# Patient Record
Sex: FEMALE | NOT HISPANIC OR LATINO | Employment: OTHER | URBAN - NONMETROPOLITAN AREA
[De-identification: names, ages, dates, MRNs, and addresses within clinical notes are randomized per-mention and may not be internally consistent; named-entity substitution may affect disease eponyms.]

---

## 2017-08-02 RX ORDER — FUROSEMIDE 40 MG/1
40 TABLET ORAL DAILY
COMMUNITY
End: 2017-09-14 | Stop reason: ALTCHOICE

## 2017-08-02 RX ORDER — ALPRAZOLAM 0.5 MG/1
0.5 TABLET ORAL NIGHTLY PRN
COMMUNITY
End: 2017-09-14 | Stop reason: ALTCHOICE

## 2017-08-02 RX ORDER — CHLORTHALIDONE 25 MG/1
12.5 TABLET ORAL DAILY
COMMUNITY

## 2017-08-02 RX ORDER — ROSUVASTATIN CALCIUM 10 MG/1
10 TABLET, COATED ORAL NIGHTLY
COMMUNITY

## 2017-08-02 RX ORDER — ASPIRIN 81 MG/1
81 TABLET ORAL DAILY
COMMUNITY

## 2017-08-02 RX ORDER — UBIDECARENONE 50 MG
CAPSULE ORAL DAILY
COMMUNITY

## 2017-08-02 RX ORDER — LOSARTAN POTASSIUM 100 MG/1
100 TABLET ORAL DAILY
COMMUNITY

## 2017-08-02 RX ORDER — LEVOTHYROXINE SODIUM 112 UG/1
112 TABLET ORAL DAILY
COMMUNITY

## 2017-08-02 RX ORDER — NIFEDIPINE 90 MG/1
90 TABLET, EXTENDED RELEASE ORAL DAILY
COMMUNITY

## 2017-08-02 RX ORDER — MAGNESIUM OXIDE 400 MG/1
400 TABLET ORAL 2 TIMES DAILY
COMMUNITY

## 2017-08-02 RX ORDER — MELATONIN 200 MCG
3 TABLET ORAL NIGHTLY
COMMUNITY

## 2017-08-03 ENCOUNTER — OFFICE VISIT (OUTPATIENT)
Dept: CARDIOLOGY | Facility: CLINIC | Age: 82
End: 2017-08-03

## 2017-08-03 VITALS
DIASTOLIC BLOOD PRESSURE: 66 MMHG | BODY MASS INDEX: 24.07 KG/M2 | HEIGHT: 64 IN | SYSTOLIC BLOOD PRESSURE: 149 MMHG | HEART RATE: 61 BPM | WEIGHT: 141 LBS

## 2017-08-03 DIAGNOSIS — I50.32 CHRONIC DIASTOLIC CHF (CONGESTIVE HEART FAILURE) (HCC): ICD-10-CM

## 2017-08-03 DIAGNOSIS — R06.02 SHORTNESS OF BREATH: ICD-10-CM

## 2017-08-03 DIAGNOSIS — I25.10 ATHEROSCLEROSIS OF NATIVE CORONARY ARTERY OF NATIVE HEART WITHOUT ANGINA PECTORIS: ICD-10-CM

## 2017-08-03 DIAGNOSIS — I48.3 TYPICAL ATRIAL FLUTTER (HCC): Primary | ICD-10-CM

## 2017-08-03 PROCEDURE — 99204 OFFICE O/P NEW MOD 45 MIN: CPT | Performed by: INTERNAL MEDICINE

## 2017-08-03 PROCEDURE — 93000 ELECTROCARDIOGRAM COMPLETE: CPT | Performed by: INTERNAL MEDICINE

## 2017-08-03 NOTE — PROGRESS NOTES
Subjective:     Encounter Date: 08/03/2017      Patient ID: Caty Sheehan is a 82 y.o. female.With a history of coronary artery disease status post 2 separate coronary artery bypass grafting procedures, the most recent in February 2017, postoperative atrial fibrillation now with new onset atrial flutter, hypertension, hyperlipidemia, who presents today to establish cardiac care locally.    Chief Complaint: Establish cardiac care    Coronary Artery Disease   Presents for follow-up visit. Symptoms include leg swelling, palpitations and shortness of breath. Pertinent negatives include no chest pain, chest pressure, chest tightness, dizziness, muscle weakness or weight gain. The symptoms have been stable. Compliance with diet is good. Compliance with exercise is variable. Compliance with medications is good.   Shortness of Breath   This is a recurrent problem. The current episode started more than 1 month ago. The problem has been gradually worsening. Associated symptoms include leg swelling. Pertinent negatives include no abdominal pain, chest pain, claudication, fever, headaches, hemoptysis, neck pain, orthopnea, PND, rash, syncope, vomiting or wheezing. The symptoms are aggravated by exercise. She has tried rest for the symptoms. The treatment provided moderate relief. Her past medical history is significant for CAD and a heart failure (chronic diastolic dysfunction).   Atrial Flutter   This is a new problem. The current episode started more than 1 month ago. The problem has been unchanged. Associated symptoms include weakness. Pertinent negatives include no abdominal pain, change in bowel habit, chest pain, chills, congestion, coughing, fever, headaches, joint swelling, myalgias, nausea, neck pain, numbness, rash or vomiting. Nothing aggravates the symptoms. She has tried nothing for the symptoms.     This is an 82-year-old white female history of coronary artery disease, status post 2 separate coronary artery  bypass grafting procedures, hypertension, hyperlipidemia, type II diabetes mellitus who presents today to establish cardiac care.  The patient underwent cardiac catheterization subsequent coronary artery bypass grafting in February 2017.  She had a postoperative course that was somewhat prolonged with recurrent admission for volume overload in the setting of chronic diastolic dysfunction.  The patient had some degree of postoperative atrial fibrillation but this resolved.  Over the last month, her granddaughter and her of noticed irregularity in her pulse and as a result of this, the patient has developed increasing shortness of breath and dyspnea on exertion that is now limiting to her activities.  These symptoms are different than what she had previously experienced prior to her coronary artery bypass grafting.  She denies any specific orthopnea, PND.  She has intermittent lower extremity edema that is not increasing.  Her weight has been stable.  She denies any significant cough or wheeze or fevers.  The patient says that her blood pressures generally well-controlled.  She does note occasional palpitations.  She denies any lightheadedness, dizziness, syncope.  She has been compliant with all of her medications.  She denies any chest discomfort.  He admits to generalized weakness and fatigue and simply states that her recovery has been somewhat prolonged.  Her granddaughter seems to think that her course dramatically worsened in terms of her energy level and her shortness of breath when she developed what appears to be atrial flutter.    The following portions of the patient's history were reviewed and updated as appropriate: allergies, current medications, past family history, past medical history, past social history, past surgical history and problem list.     Past Medical History:   Diagnosis Date   • Arthritis    • Atherosclerosis of native coronary artery of native heart without angina pectoris    • Atrial  fibrillation    • Colon carcinoma    • Diabetes mellitus type 2, uncomplicated    • History of essential hypertension    • Hyperlipidemia    • Hypertension    • Hypothyroidism    • S/P CABG (coronary artery bypass graft)      Past Surgical History:   Procedure Laterality Date   • BLADDER SURGERY      x2   • CARDIAC CATHETERIZATION Left     1/8/1998-Stent to RCA-multi-link 6/5/1998-excimer laser angioplasty and balloon angioplasty to the right coronary artery. 6/9/2003-candidate for cardiothoracic surgery consultation and CABG.   • CORONARY ARTERY BYPASS GRAFT       6/10/2003-, LIMA to LAD, SVG to D1, SVG to OM1, SVG to distal RCA   • OTHER SURGICAL HISTORY      Implant - lt 2006, rt 2008   • TOTAL ABDOMINAL HYSTERECTOMY         Current Outpatient Prescriptions:   •  ALPRAZolam (XANAX) 0.5 MG tablet, Take 0.5 mg by mouth At Night As Needed for Anxiety., Disp: , Rfl:   •  aspirin 81 MG EC tablet, Take 81 mg by mouth Daily., Disp: , Rfl:   •  chlorthalidone (HYGROTON) 12.5 MG half tablet, Take 12.5 mg by mouth Daily., Disp: , Rfl:   •  Cholecalciferol 1000 UNITS tablet dispersible, Take 1,000 Units by mouth Daily., Disp: , Rfl:   •  coenzyme Q10 50 MG capsule capsule, Take  by mouth Daily., Disp: , Rfl:   •  furosemide (LASIX) 40 MG tablet, Take 40 mg by mouth Daily., Disp: , Rfl:   •  Insulin Glargine (LANTUS SOLOSTAR) 100 UNIT/ML injection pen, Inject 45 Units under the skin Daily. 20 UNITS AM & 25 UNITS PM, Disp: , Rfl:   •  levothyroxine (SYNTHROID, LEVOTHROID) 112 MCG tablet, Take 112 mcg by mouth Daily., Disp: , Rfl:   •  losartan (COZAAR) 100 MG tablet, Take 100 mg by mouth Daily., Disp: , Rfl:   •  magnesium oxide (MAG-OX) 400 MG tablet, Take 400 mg by mouth 2 (Two) Times a Day., Disp: , Rfl:   •  Melatonin 3 MG tablet, Take 3 mg by mouth Every Night., Disp: , Rfl:   •  metoprolol tartrate (LOPRESSOR) 25 MG tablet, Take 25 mg by mouth 2 (Two) Times a Day. TAKE 1/2 TABLET BID, Disp: , Rfl:   •  NIFEdipine XL  (PROCARDIA XL) 90 MG 24 hr tablet, Take 90 mg by mouth Daily., Disp: , Rfl:   •  rivaroxaban (XARELTO) 15 MG tablet, Take 15 mg by mouth Daily With Dinner., Disp: , Rfl:   •  rosuvastatin (CRESTOR) 10 MG tablet, Take 10 mg by mouth Every Night., Disp: , Rfl:     Allergies   Allergen Reactions   • Contrast Dye      IV Contrast Media     Social History   Substance Use Topics   • Smoking status: Never Smoker   • Smokeless tobacco: Never Used   • Alcohol use No     Family History   Problem Relation Age of Onset   • Diabetes Mother    • Heart disease Mother    • Suicidality Father      Review of Systems   Constitution: Positive for weakness and malaise/fatigue. Negative for chills, fever, night sweats, weight gain and weight loss.   HENT: Negative for congestion, headaches and hearing loss.    Eyes: Negative for blurred vision and pain.   Cardiovascular: Positive for dyspnea on exertion, leg swelling and palpitations. Negative for chest pain, claudication, irregular heartbeat, orthopnea, paroxysmal nocturnal dyspnea and syncope.   Respiratory: Positive for shortness of breath. Negative for chest tightness, cough, hemoptysis and wheezing.    Endocrine: Negative for cold intolerance, heat intolerance, polydipsia and polyuria.   Hematologic/Lymphatic: Negative for adenopathy and bleeding problem. Does not bruise/bleed easily.   Skin: Negative for color change, poor wound healing and rash.   Musculoskeletal: Negative for arthritis, back pain, joint pain, joint swelling, muscle weakness, myalgias and neck pain.   Gastrointestinal: Negative for abdominal pain, change in bowel habit, constipation, diarrhea, heartburn, hematochezia, melena, nausea and vomiting.   Genitourinary: Negative for bladder incontinence, dysuria, frequency, hematuria and nocturia.   Neurological: Negative for dizziness, focal weakness, light-headedness, loss of balance, numbness and seizures.   Psychiatric/Behavioral: Negative for altered mental status,  memory loss and substance abuse.   Allergic/Immunologic: Negative for HIV exposure, hives and persistent infections.       ECG 12 Lead  Date/Time: 8/3/2017 10:52 AM  Performed by: IRVIN GUZMAN  Authorized by: IRVIN GUZMAN   Comparison: compared with previous ECG from 2/17/2017  Comparison to previous ECG: Atrial flutter now present  Rhythm: atrial flutter  Ectopy: PVCs  Rate: normal  BPM: 61  Conduction comments: Variable AV block  QRS axis: normal  Other findings: PRWP  Clinical impression: abnormal ECG             Objective:     Physical Exam   Constitutional: She is oriented to person, place, and time. Vital signs are normal. She appears well-developed and well-nourished. She is cooperative.  Non-toxic appearance. No distress.   HENT:   Head: Normocephalic and atraumatic.   Right Ear: External ear normal.   Left Ear: External ear normal.   Nose: Nose normal.   Mouth/Throat: Uvula is midline, oropharynx is clear and moist and mucous membranes are normal. Mucous membranes are not pale, not dry and not cyanotic. No oropharyngeal exudate.   Eyes: EOM and lids are normal. Pupils are equal, round, and reactive to light.   Neck: Normal range of motion. Neck supple. No hepatojugular reflux and no JVD present. Carotid bruit is not present. No tracheal deviation and no edema present. No thyroid mass and no thyromegaly present.   Cardiovascular: Normal rate, S1 normal, S2 normal, normal heart sounds, intact distal pulses and normal pulses.  An irregularly irregular rhythm present.  No extrasystoles are present. PMI is not displaced.  Exam reveals no gallop and no friction rub.    No murmur heard.  Pulses:       Radial pulses are 2+ on the right side, and 2+ on the left side.        Femoral pulses are 2+ on the right side, and 2+ on the left side.       Dorsalis pedis pulses are 2+ on the right side, and 2+ on the left side.        Posterior tibial pulses are 2+ on the right side, and 2+ on the left side.  "  Pulmonary/Chest: Effort normal and breath sounds normal. No accessory muscle usage. No respiratory distress. She has no wheezes. She has no rales. She exhibits no tenderness.   Abdominal: Soft. Normal appearance and bowel sounds are normal. She exhibits no distension, no abdominal bruit and no pulsatile midline mass. There is no hepatosplenomegaly. There is no tenderness.   Musculoskeletal: Normal range of motion. She exhibits edema (trace bilaterally). She exhibits no tenderness or deformity.   Lymphadenopathy:     She has no cervical adenopathy.   Neurological: She is oriented to person, place, and time. She has normal strength. No cranial nerve deficit.   Skin: Skin is warm, dry and intact. No rash noted. She is not diaphoretic. No cyanosis or erythema. Nails show no clubbing.   Psychiatric: She has a normal mood and affect. Her speech is normal and behavior is normal. Thought content normal.   Vitals reviewed.    /66 (BP Location: Left arm, Patient Position: Sitting)  Pulse 61  Ht 64\" (162.6 cm)  Wt 141 lb (64 kg)  BMI 24.2 kg/m2    Data/Lab Review:     I reviewed all records that were sent from the patient from [facility] cardiology: Her most recent echocardiogram on 4/13/17 showed left ventricular ejection fraction of 55-60%.  This appears to have been a limited echocardiogram.  Previous echocardiogram on 3/6/17 showed mild to moderate increase in left atrial size left pleural effusion otherwise similar findings to the most current echocardiogram.  A prior echocardiogram shortly after the patient's cardiac catheterization on 2/18/17 shows normal left ventricular ejection fraction, grade 1 diastolic dysfunction, this reports normal left atrial size.  No other significant findings.    The patient's cardiac catheterization on 2/17/17 was reviewed.  I did review the report.  Left ventricular end-diastolic pressure at that time was 19.  There is a 90% left anterior descending artery stenosis followed " by 100% mid vessel occlusion.  The left circumflex has an 85% proximal stenosis.  The right coronary artery has a 90% proximal stenosis.  3 separate saphenous vein grafts were occluded.  There was a patent left internal mammary to left anterior descending artery.  For this, the patient subsequently underwent repeat coronary artery bypass grafting.      Assessment:          Diagnosis Plan   1. Typical atrial flutter  Cardioversion External in Cardiology Department    ECG 12 Lead    Basic Metabolic Panel   2. Atherosclerosis of native coronary artery of native heart without angina pectoris     3. Chronic diastolic CHF (congestive heart failure)     4. Shortness of breath            Plan:       1.  Typical atrial flutter: The patient appears to have developed atrial flutter and her postoperative setting.  I feel like her symptoms, according to her and her granddaughter, worsened once this was identified.  I think it is reasonable at this time to try to get her back in normal sinus rhythm.  She is currently anticoagulated with Xarelto.  I have encouraged her to continue this.  She has not been on this for quite a month he had however we will schedule her cardioversion on 8/14/17 which should be greater than 4 weeks duration of uninterrupted anticoagulation therapy.  I discussed the procedure, risks, benefits, alternatives of cardioversion with the patient and granddaughter who is present today.  Both are agreeable to proceed in this fashion.  In addition, will check a basic metabolic panel, as the patient is currently on 15 mg of Xarelto, to assure that this is the proper dose for her, based on her renal function.    2.  Coronary artery disease: The patient appears to be clinically stable in terms of her coronary artery disease with no chest pain at this time.  She remains on aspirin, statin, beta blocker therapies.  Her blood pressure is reasonably well controlled given her age.  Her heart rate is well-controlled  currently however she is atrial flutter as noted above.    3.  Chronic diastolic congestive heart failure.  The patient appears to be euvolemic on examination today.  Her symptoms of shortness of breath seemed to worsen after the development of her atrial arrhythmia.  I think it is reasonable to try to get her back in sinus rhythm at this time and then reevaluate her shortness of breath.  In any event, no changes are recommended at this time.    4.  Shortness of breath: The patient has some degree of chronic shortness of breath that worsened after the development of her atrial arrhythmias.  As stated above, we will try to get her back in sinus rhythm at this time and reevaluate her symptoms.    Follow-up pending the results the patient's cardioversion.    EMR Dragon/Transcription disclaimer: Much of this encounter note is an electronic transcription/translation of spoken language to printed text. The electronic translation of spoken language may permit erroneous, or at times, nonsensical words or phrases to be inadvertently transcribed; although I have reviewed the note for such errors, some may still exist.

## 2017-08-14 ENCOUNTER — HOSPITAL ENCOUNTER (OUTPATIENT)
Dept: CARDIOLOGY | Facility: HOSPITAL | Age: 82
Discharge: HOME | End: 2017-08-14
Attending: INTERNAL MEDICINE | Admitting: INTERNAL MEDICINE

## 2017-08-14 VITALS
HEART RATE: 65 BPM | TEMPERATURE: 97.6 F | OXYGEN SATURATION: 98 % | DIASTOLIC BLOOD PRESSURE: 76 MMHG | SYSTOLIC BLOOD PRESSURE: 130 MMHG | RESPIRATION RATE: 16 BRPM

## 2017-08-14 DIAGNOSIS — I48.3 TYPICAL ATRIAL FLUTTER (HCC): ICD-10-CM

## 2017-08-14 PROCEDURE — 93005 ELECTROCARDIOGRAM TRACING: CPT | Performed by: INTERNAL MEDICINE

## 2017-08-14 PROCEDURE — 93010 ELECTROCARDIOGRAM REPORT: CPT | Performed by: INTERNAL MEDICINE

## 2017-09-14 ENCOUNTER — OFFICE VISIT (OUTPATIENT)
Dept: CARDIOLOGY | Facility: CLINIC | Age: 82
End: 2017-09-14

## 2017-09-14 VITALS
WEIGHT: 143 LBS | SYSTOLIC BLOOD PRESSURE: 120 MMHG | HEART RATE: 64 BPM | BODY MASS INDEX: 24.41 KG/M2 | DIASTOLIC BLOOD PRESSURE: 54 MMHG | HEIGHT: 64 IN

## 2017-09-14 DIAGNOSIS — I25.10 ATHEROSCLEROSIS OF NATIVE CORONARY ARTERY OF NATIVE HEART WITHOUT ANGINA PECTORIS: ICD-10-CM

## 2017-09-14 DIAGNOSIS — I48.92 PAROXYSMAL ATRIAL FLUTTER (HCC): Primary | ICD-10-CM

## 2017-09-14 DIAGNOSIS — I50.32 CHRONIC DIASTOLIC CHF (CONGESTIVE HEART FAILURE) (HCC): ICD-10-CM

## 2017-09-14 PROCEDURE — 93000 ELECTROCARDIOGRAM COMPLETE: CPT | Performed by: INTERNAL MEDICINE

## 2017-09-14 PROCEDURE — 99214 OFFICE O/P EST MOD 30 MIN: CPT | Performed by: INTERNAL MEDICINE
